# Patient Record
Sex: FEMALE | Race: WHITE | Employment: FULL TIME | URBAN - METROPOLITAN AREA
[De-identification: names, ages, dates, MRNs, and addresses within clinical notes are randomized per-mention and may not be internally consistent; named-entity substitution may affect disease eponyms.]

---

## 2018-03-07 ENCOUNTER — HOSPITAL ENCOUNTER (OUTPATIENT)
Dept: CT IMAGING | Age: 58
Discharge: HOME | End: 2018-03-07
Payer: SELF-PAY

## 2018-03-07 DIAGNOSIS — Z00.00 PREVENTATIVE HEALTH CARE: ICD-10-CM

## 2018-03-07 PROCEDURE — 75571 CT HRT W/O DYE W/CA TEST: CPT

## 2018-03-07 NOTE — CARDIO/PULMONARY
Cardiac Rehab: Spoke with patient about Calcium Scoring results (3). Discussed significance of results, and CAD risk factors. Pt reported that she was being screened because her father \"dropped dead of a heart attack\" at a young age. Denied HTN, high cholesterol, DM, and smoking. Pt reported she is overweight. Encouraged heart healthy/low sodium diet. 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 Encouraged daily walking program. Pt indicated she would continue to f/u with PCP. Copy of report sent to patient, with handout on learning about CAD.

## 2022-02-02 NOTE — PROGRESS NOTES
HPI: Hussain Liu (: 1960) is a 64 y.o. female, patient, here for evaluation of the following chief complaint(s):    Hand Pain (left thumb pain)  Patient is seen today to evaluate her left hand. The patient injured her left thumb when she tripped going up stairs on 2021. She hyperextended her left thumb. Her examination was apparently very clinically obvious for an ulnar collateral ligament rupture that was confirmed under FluoroScan guidance. She underwent repair of the left thumb ulnar collateral ligament by Dr. Emi Yuen on 2021. She recalls the 2 anchors were utilized for stabilization. She then attended nearly 6 months of therapy which concluded on . The patient reports that she did not progress but she did restore symmetric MP motion of 35 to 40 degrees of flexion. She has stiffness of the IP joint limiting flexion of 35 degrees versus 60 in the right thumb. She has tried additional treatment including acupuncture and myofascial release. She works as a nurse in the operating room and complains of aching pain even slight subtle numbness to the volar tip of the thumb and is seen for further treatment. Vitals:  Ht 5' 8\" (1.727 m)   Wt 228 lb (103.4 kg)   BMI 34.67 kg/m²    Body mass index is 34.67 kg/m². Allergies   Allergen Reactions    Latex Rash    Covid-19 Vacc,Mrna(Pfizer)(Pf) Anaphylaxis       Current Outpatient Medications   Medication Sig    progesterone (PROMETRIUM) 100 mg capsule Take 100 mg by mouth daily.  omeprazole (PRILOSEC) 40 mg capsule 1 capsule 30 minutes before morning meal    aspirin 81 mg cap 1 tablet    MULTIVITAMIN 12 IV 1 tablet     No current facility-administered medications for this visit. History reviewed. No pertinent past medical history.      Past Surgical History:   Procedure Laterality Date    HAND/FINGER SURGERY UNLISTED      HX APPENDECTOMY      HX TUBAL LIGATION Bilateral     IR GUIDE ASP OVARIAN CYST VAG APPROACH Right        Family History   Problem Relation Age of Onset    Heart Disease Mother     Stroke Mother     Heart Disease Father     OSTEOARTHRITIS Maternal Grandmother     Diabetes Maternal Grandfather     Diabetes Paternal Grandmother     Diabetes Paternal Grandfather     Heart Disease Paternal Grandfather     Cancer Sister     Alcohol abuse Sister         Social History     Tobacco Use    Smoking status: Never Smoker    Smokeless tobacco: Never Used   Substance Use Topics    Alcohol use: Yes    Drug use: Not on file        Review of Systems   All other systems reviewed and are negative. Physical Exam    The patient's left thumb has a healed scar to the ulnar side of the MP joint. There is excellent clinical stability stress test in the ulnar collateral ligament repair site which is slightly stiffer than comparing to the nonoperative uninjured right thumb. Both MP joints flexed 35 to 40 degrees. The right thumb IP joint can flex 60 to 65 degrees in the left thumb IP joint 30-35. She has subtle diminished sensation to the volar tip only. Negative Tinel's at carpal tunnel. Imaging:    XR Results (most recent):  Results from Appointment encounter on 02/03/22    XR THUMB LT MIN 2 V    Narrative  AP, lateral oblique x-ray of the left thumb show a single anchor in the ulnar metacarpal head after ulnar collateral remote repair by another surgeon. There is osteopenia but no overt narrowing of the IP or MP joints. No fracture. ASSESSMENT/PLAN:  Below is the assessment and plan developed based on review of pertinent history, physical exam, labs, studies, and medications. Patient fell and sustained a left thumb ulnar collateral ligament rupture going up stairs at her home on 4/2/2021. This required operative repair on 4/14/2021.  She healed this very well but despite 6 months of therapy has an IP joint extension contracture limiting flexion of 30 to 35 degrees which compares to 60-65 on the right side. The MP joint is very stable and has symmetric 35 to 40 degrees of flexion when compared to the right side. I explained to her that a surgical option would be an arthrotomy capsulotomy procedure of the IP joint but that for now I would recommend more aggressive passive active assisted and active motion recovery. I can see her back in 2 to 3 months to check her progress or at any time for further treatment. 1. Primary osteoarthritis, left hand  2. Left hand pain  3. Rupture of ulnar collateral ligament of left thumb, sequela  -     XR THUMB LT MIN 2 V; Future  4. Stiffness of finger joint of left hand      No follow-ups on file. An electronic signature was used to authenticate this note.   -- Janell Omer MD

## 2022-02-03 ENCOUNTER — OFFICE VISIT (OUTPATIENT)
Dept: ORTHOPEDIC SURGERY | Age: 62
End: 2022-02-03
Payer: COMMERCIAL

## 2022-02-03 VITALS — HEIGHT: 68 IN | WEIGHT: 228 LBS | BODY MASS INDEX: 34.56 KG/M2

## 2022-02-03 DIAGNOSIS — M25.642 STIFFNESS OF FINGER JOINT OF LEFT HAND: ICD-10-CM

## 2022-02-03 DIAGNOSIS — M19.042 PRIMARY OSTEOARTHRITIS, LEFT HAND: Primary | ICD-10-CM

## 2022-02-03 DIAGNOSIS — M79.642 LEFT HAND PAIN: ICD-10-CM

## 2022-02-03 DIAGNOSIS — S63.642S RUPTURE OF ULNAR COLLATERAL LIGAMENT OF LEFT THUMB, SEQUELA: ICD-10-CM

## 2022-02-03 PROCEDURE — 99203 OFFICE O/P NEW LOW 30 MIN: CPT | Performed by: ORTHOPAEDIC SURGERY

## 2022-02-03 RX ORDER — PROGESTERONE 100 MG/1
100 CAPSULE ORAL DAILY
COMMUNITY

## 2022-02-03 RX ORDER — OMEPRAZOLE 40 MG/1
CAPSULE, DELAYED RELEASE ORAL
COMMUNITY

## 2022-02-03 NOTE — PATIENT INSTRUCTIONS
Hand Exercises      Tendon glides   1. In this exercise, the steps follow one another to a make a continuous movement. 2. With your affected hand, point your fingers and thumb straight up. Your wrist should be relaxed, following the line of your fingers and thumb. 3. Curl your fingers so that the top two joints in them are bent, and your fingers wrap down. Your fingertips should touch or be near the base of your fingers. Your fingers will look like a hook. 4. Make a fist by bending your knuckles. Your thumb can gently rest against your index (pointing) finger. 5. Unwind your fingers slightly so that your fingertips can touch the base of your palm. Your thumb can rest against your index finger. 6. Move back to your starting position, with your fingers and thumb pointing up. 7. Repeat the series of motions 8 to 12 times. 8. Switch hands and repeat steps 1 through 6, even if only one hand is sore. Intrinsic flexion   1. Rest your affected hand on a table and bend the large joints where your fingers connect to your hand. Keep your thumb and the other joints in your fingers straight. 2. Slowly straighten your fingers. Your wrist should be relaxed, following the line of your fingers and thumb. 3. Move back to your starting position, with your hand bent. 4. Repeat 8 to 12 times. 5. Switch hands and repeat steps 1 through 4, even if only one hand is sore. Finger extension   1. Place your affected hand flat on a table. 2. Lift and then lower one finger at a time off the table. 3. Repeat 8 to 12 times. 4. Switch hands and repeat steps 1 through 3, even if only one hand is sore. MP extension   1. Place your good hand on a table, palm up.  Put your affected hand on top of your good hand with your fingers wrapped around the thumb of your good hand like you are making a fist.  2. Slowly uncurl the joints of your affected hand where your fingers connect to your hand so that only the top two joints of your fingers are bent. Your fingers will look like a hook. 3. Move back to your starting position, with your fingers wrapped around your good thumb. 4. Repeat 8 to 12 times. 5. Switch hands and repeat steps 1 through 4, even if only one hand is sore. Thumb Arthritis: Exercises  Introduction  Here are some examples of exercises for you to try. The exercises may be suggested for a condition or for rehabilitation. Start each exercise slowly. Ease off the exercises if you start to have pain. You will be told when to start these exercises and which ones will work best for you. How to do the exercises  Thumb IP flexion    1. Place your forearm and hand on a table with your affected thumb pointing up. 2. With your other hand, hold your thumb steady just below the joint nearest your thumbnail. 3. Bend the tip of your thumb downward, then straighten it. 4. Repeat 8 to 12 times. 5. Switch hands and repeat steps 1 through 4, even if only one thumb is sore. Thumb MP flexion    1. Place your forearm and hand on a table with your affected thumb pointing up. 2. With your other hand, hold the base of your thumb and palm steady. 3. Bend your thumb downward where it meets your palm, then straighten it. 4. Repeat 8 to 12 times. 5. Switch hands and repeat steps 1 through 4, even if only one thumb is sore. Thumb opposition    1. With your affected hand, point your fingers and thumb straight up. Your wrist should be relaxed, following the line of your fingers and thumb. 2. Touch your affected thumb to each finger, one finger at a time. This will look like an \"okay\" sign, but try to keep your other fingers straight and pointing upward as much as you can. 3. Repeat 8 to 12 times. 4. Switch hands and repeat steps 1 through 3, even if only one thumb is sore. Follow-up care is a key part of your treatment and safety. Be sure to make and go to all appointments, and call your doctor if you are having problems.  It's also a good idea to know your test results and keep a list of the medicines you take. Where can you learn more? Go to http://www.gray.com/  Enter S402 in the search box to learn more about \"Thumb Arthritis: Exercises. \"  Current as of: July 1, 2021               Content Version: 13.0  © 2125-6873 [employer]. Care instructions adapted under license by [employer] (which disclaims liability or warranty for this information). If you have questions about a medical condition or this instruction, always ask your healthcare professional. [address] any warranty or liability for your use of this information.

## 2022-02-03 NOTE — LETTER
2/3/2022    Patient: Jay Ramírez   YOB: 1960   Date of Visit: 2/3/2022     Rubi Cardoso DO  [address] [address]  Via Fax: [phone]    Dear Rubi Cardoso DO,      Thank you for referring Ms. Angélica Ramírez to [facility] for evaluation. My notes for this consultation are attached. If you have questions, please do not hesitate to call me. I look forward to following your patient along with you.       Sincerely,    Amol De La O MD

## 2022-06-23 ENCOUNTER — TRANSCRIBE ORDER (OUTPATIENT)
Dept: SCHEDULING | Age: 62
End: 2022-06-23

## 2022-06-23 DIAGNOSIS — R19.00 ABDOMINAL MASS: Primary | ICD-10-CM

## 2022-07-13 ENCOUNTER — HOSPITAL ENCOUNTER (OUTPATIENT)
Dept: CT IMAGING | Age: 62
Discharge: HOME | End: 2022-07-13
Attending: FAMILY MEDICINE
Payer: COMMERCIAL

## 2022-07-13 DIAGNOSIS — R19.00 ABDOMINAL MASS: ICD-10-CM

## 2022-07-13 PROCEDURE — 74011000636 HC RX REV CODE- 636: Performed by: RADIOLOGY

## 2022-07-13 PROCEDURE — 74177 CT ABD & PELVIS W/CONTRAST: CPT

## 2022-07-13 RX ADMIN — IOPAMIDOL 100 ML: 755 INJECTION, SOLUTION INTRAVENOUS at 09:16

## 2023-03-10 ENCOUNTER — HOSPITAL ENCOUNTER (OUTPATIENT)
Dept: CT IMAGING | Age: 63
Discharge: HOME | End: 2023-03-10
Attending: INTERNAL MEDICINE
Payer: COMMERCIAL

## 2023-03-10 ENCOUNTER — TRANSCRIBE ORDER (OUTPATIENT)
Dept: SCHEDULING | Age: 63
End: 2023-03-10

## 2023-03-10 DIAGNOSIS — R93.89 ABNORMAL CXR: ICD-10-CM

## 2023-03-10 DIAGNOSIS — E78.00 PURE HYPERCHOLESTEROLEMIA: ICD-10-CM

## 2023-03-10 DIAGNOSIS — I10 HYPERTENSION, UNSPECIFIED TYPE: ICD-10-CM

## 2023-03-10 DIAGNOSIS — R07.9 CHEST PAIN, UNSPECIFIED TYPE: Primary | ICD-10-CM

## 2023-03-10 DIAGNOSIS — R07.9 CHEST PAIN, UNSPECIFIED TYPE: ICD-10-CM

## 2023-03-10 PROCEDURE — 71260 CT THORAX DX C+: CPT

## 2023-03-10 PROCEDURE — 74011000636 HC RX REV CODE- 636: Performed by: RADIOLOGY

## 2023-03-10 RX ADMIN — IOPAMIDOL 100 ML: 755 INJECTION, SOLUTION INTRAVENOUS at 12:51

## 2023-03-14 ENCOUNTER — TRANSCRIBE ORDER (OUTPATIENT)
Dept: SCHEDULING | Age: 63
End: 2023-03-14

## 2023-03-14 DIAGNOSIS — I16.0 HYPERTENSIVE URGENCY: Primary | ICD-10-CM

## 2023-03-14 DIAGNOSIS — H93.A1 PULSATILE TINNITUS OF RIGHT EAR: ICD-10-CM

## 2023-03-14 DIAGNOSIS — E78.00 PURE HYPERCHOLESTEROLEMIA: ICD-10-CM

## 2023-03-14 DIAGNOSIS — I10 HYPERTENSION, UNSPECIFIED TYPE: ICD-10-CM

## 2023-03-17 ENCOUNTER — HOSPITAL ENCOUNTER (OUTPATIENT)
Dept: MRI IMAGING | Age: 63
Discharge: HOME | End: 2023-03-17
Attending: INTERNAL MEDICINE
Payer: COMMERCIAL

## 2023-03-17 VITALS — WEIGHT: 215 LBS | BODY MASS INDEX: 32.69 KG/M2

## 2023-03-17 DIAGNOSIS — H93.A1 PULSATILE TINNITUS OF RIGHT EAR: ICD-10-CM

## 2023-03-17 DIAGNOSIS — E78.00 PURE HYPERCHOLESTEROLEMIA: ICD-10-CM

## 2023-03-17 DIAGNOSIS — I10 HYPERTENSION, UNSPECIFIED TYPE: ICD-10-CM

## 2023-03-17 DIAGNOSIS — I16.0 HYPERTENSIVE URGENCY: ICD-10-CM

## 2023-03-17 PROCEDURE — 70553 MRI BRAIN STEM W/O & W/DYE: CPT

## 2023-03-17 PROCEDURE — 74011250636 HC RX REV CODE- 250/636: Performed by: RADIOLOGY

## 2023-03-17 PROCEDURE — A9576 INJ PROHANCE MULTIPACK: HCPCS | Performed by: RADIOLOGY

## 2023-03-17 RX ADMIN — GADOTERIDOL 20 ML: 279.3 INJECTION, SOLUTION INTRAVENOUS at 19:49

## 2023-12-06 ENCOUNTER — HOSPITAL ENCOUNTER (OUTPATIENT)
Facility: HOSPITAL | Age: 63
Discharge: HOME | End: 2023-12-09
Payer: COMMERCIAL

## 2023-12-06 DIAGNOSIS — R10.9 LEFT FLANK PAIN: ICD-10-CM

## 2023-12-06 DIAGNOSIS — K21.00 GASTROESOPHAGEAL REFLUX DISEASE WITH ESOPHAGITIS WITHOUT HEMORRHAGE: ICD-10-CM

## 2023-12-06 DIAGNOSIS — K57.30 DIVERTICULOSIS OF COLON: ICD-10-CM

## 2023-12-06 DIAGNOSIS — K80.20 GALLSTONES: ICD-10-CM

## 2023-12-06 PROCEDURE — 74178 CT ABD&PLV WO CNTR FLWD CNTR: CPT

## 2023-12-06 PROCEDURE — 6360000004 HC RX CONTRAST MEDICATION

## 2023-12-06 RX ADMIN — IOPAMIDOL 100 ML: 755 INJECTION, SOLUTION INTRAVENOUS at 16:24

## 2024-01-08 ENCOUNTER — TRANSCRIBE ORDERS (OUTPATIENT)
Facility: HOSPITAL | Age: 64
End: 2024-01-08

## 2024-01-08 DIAGNOSIS — I65.29 STENOSIS OF CAROTID ARTERY, UNSPECIFIED LATERALITY: Primary | ICD-10-CM

## 2024-01-15 ENCOUNTER — HOSPITAL ENCOUNTER (OUTPATIENT)
Facility: HOSPITAL | Age: 64
Discharge: HOME | End: 2024-01-18
Payer: COMMERCIAL

## 2024-01-15 DIAGNOSIS — I65.29 STENOSIS OF CAROTID ARTERY, UNSPECIFIED LATERALITY: ICD-10-CM

## 2024-01-15 PROCEDURE — 6360000004 HC RX CONTRAST MEDICATION

## 2024-01-15 PROCEDURE — 70496 CT ANGIOGRAPHY HEAD: CPT

## 2024-01-15 RX ADMIN — IOPAMIDOL 75 ML: 755 INJECTION, SOLUTION INTRAVENOUS at 16:18

## 2024-12-02 ENCOUNTER — HOSPITAL ENCOUNTER (OUTPATIENT)
Facility: HOSPITAL | Age: 64
Discharge: HOME | End: 2024-12-05
Attending: INTERNAL MEDICINE
Payer: COMMERCIAL

## 2024-12-02 ENCOUNTER — TRANSCRIBE ORDERS (OUTPATIENT)
Facility: HOSPITAL | Age: 64
End: 2024-12-02

## 2024-12-02 DIAGNOSIS — R10.84 ABDOMINAL PAIN, GENERALIZED: Primary | ICD-10-CM

## 2024-12-02 DIAGNOSIS — R10.84 ABDOMINAL PAIN, GENERALIZED: ICD-10-CM

## 2024-12-02 PROCEDURE — 74018 RADEX ABDOMEN 1 VIEW: CPT
